# Patient Record
Sex: FEMALE | Race: WHITE | NOT HISPANIC OR LATINO | URBAN - METROPOLITAN AREA
[De-identification: names, ages, dates, MRNs, and addresses within clinical notes are randomized per-mention and may not be internally consistent; named-entity substitution may affect disease eponyms.]

---

## 2022-04-25 ENCOUNTER — CONSULTATION/EVALUATION (OUTPATIENT)
Dept: URBAN - METROPOLITAN AREA CLINIC 14 | Facility: CLINIC | Age: 32
End: 2022-04-25

## 2022-04-25 DIAGNOSIS — H52.13: ICD-10-CM

## 2022-04-25 DIAGNOSIS — H52.221: ICD-10-CM

## 2022-04-25 DIAGNOSIS — H52.7: ICD-10-CM

## 2022-04-25 PROCEDURE — 99499LK REFRACTIVE CONSULT/LASIK

## 2022-04-25 ASSESSMENT — KERATOMETRY
OD_K1POWER_DIOPTERS: 45.75
OD_AXISANGLE_DEGREES: 171
OS_K2POWER_DIOPTERS: 46
OS_AXISANGLE2_DEGREES: 109
OS_K1POWER_DIOPTERS: 45.75
OS_AXISANGLE_DEGREES: 19
OD_K2POWER_DIOPTERS: 46.50
OD_AXISANGLE2_DEGREES: 81

## 2022-04-25 ASSESSMENT — TONOMETRY
OS_IOP_MMHG: 11
OD_IOP_MMHG: 11

## 2022-04-25 ASSESSMENT — PACHYMETRY
OD_CT_UM: 510
OS_CT_UM: 515

## 2022-04-25 ASSESSMENT — VISUAL ACUITY
OS_SC: 20/25
OD_SC: 20/100

## 2022-05-05 ENCOUNTER — CLINIC PROCEDURE ONLY (OUTPATIENT)
Dept: URBAN - METROPOLITAN AREA CLINIC 14 | Facility: CLINIC | Age: 32
End: 2022-05-05

## 2022-05-05 DIAGNOSIS — H52.7: ICD-10-CM

## 2022-05-05 DIAGNOSIS — H52.13: ICD-10-CM

## 2022-05-05 DIAGNOSIS — H52.221: ICD-10-CM

## 2022-05-05 PROCEDURE — 66999L7KDS LASIK KDS PROMO 7

## 2022-05-05 PROCEDURE — 99199VFL VISION FOR LIFE

## 2022-05-05 ASSESSMENT — VISUAL ACUITY
OS_BCVA: 20/20
OD_BCVA: 20/20

## 2022-05-05 NOTE — PROCEDURE NOTE: CLINICAL
PROCEDURE NOTE: LASIK OU. Diagnosis: Refractive Error. Anesthesia: Oral Sedative. Prep: Antibiotic Drops. Betadine. After discussing risks, benefits and alternatives, patient has elected to proceed with the procedure and an informed consent was obtained. Prior to commencing surgery, patient identification, surgical procedure, site, and side with a time out were confirmed by the physician. Several drops of topical anesthetic and one drop of povidone iodine were instilled, and the eye was flushed with balance salt solution. A non-aspirating lid speculum was placed, and the patient was positioned under the femtosecond laser. A sterile patient interface was placed on the eye. After pressure was confirmed, and suction was achieved, the eye and flap procedure were centered and laser was applied, and LASIK flap created. The suction was discontinued, the patient interface was discarded, and the lid speculum removed. The patient was then positioned under the excimer laser. Prior to the treatment, patient identification, surgical procedure, site, and side with a time out were confirmed by the physician. A drop of topical anesthetic instilled. The patient was draped using a clean technique to isolate and cover the lashes and lid margins. An aspirating lid speculum was placed. A drop of balance salt solution was instilled. The edge of the LASIK flap was located and lifted with the Karey I IntraLase flap . The corneal bed dried with a Merocel eye sponge, the pupil was centered, and laser treatment was applied. An additional drop of balance salt solution applied to corneal bed; flap was repositioned/floated into position. Excess Balance Salt Solution was removed with a Stringer ring. The corneal flap was smoothed with wet and dry Merocel. A drop of topical antibiotic and steroid was instilled consecutively. The lid speculum removed. The patient was escorted to recovery area in stable condition and without complication. Post procedure instructions given. Jose Francisco Hewitt

## 2022-05-06 ENCOUNTER — POST-OP (OUTPATIENT)
Dept: URBAN - METROPOLITAN AREA CLINIC 14 | Facility: CLINIC | Age: 32
End: 2022-05-06

## 2022-05-06 DIAGNOSIS — Z98.890: ICD-10-CM

## 2022-05-06 PROCEDURE — 99024LK POST OP LASIK CARE ONLY

## 2022-05-06 ASSESSMENT — VISUAL ACUITY
OU_SC: 20/25-2
OS_SC: 20/30+1
OD_SC: 20/40

## 2022-05-12 ENCOUNTER — POST-OP (OUTPATIENT)
Dept: URBAN - METROPOLITAN AREA CLINIC 14 | Facility: CLINIC | Age: 32
End: 2022-05-12

## 2022-05-12 DIAGNOSIS — Z98.890: ICD-10-CM

## 2022-05-12 PROCEDURE — 99024LK POST OP LASIK CARE ONLY

## 2022-05-12 ASSESSMENT — VISUAL ACUITY
OS_SC: 20/20
OD_SC: 20/25

## 2022-06-01 ENCOUNTER — POST-OP (OUTPATIENT)
Dept: URBAN - METROPOLITAN AREA CLINIC 14 | Facility: CLINIC | Age: 32
End: 2022-06-01

## 2022-06-01 DIAGNOSIS — Z98.890: ICD-10-CM

## 2022-06-01 PROCEDURE — 99024LK POST OP LASIK CARE ONLY

## 2022-06-01 ASSESSMENT — VISUAL ACUITY
OD_SC: 20/30
OS_SC: 20/30
OU_SC: 20/25

## 2022-08-16 ENCOUNTER — ESTABLISHED PATIENT (OUTPATIENT)
Dept: URBAN - METROPOLITAN AREA CLINIC 14 | Facility: CLINIC | Age: 32
End: 2022-08-16

## 2022-08-16 DIAGNOSIS — Z98.890: ICD-10-CM

## 2022-08-16 PROCEDURE — 99024LK POST OP LASIK CARE ONLY

## 2022-08-16 ASSESSMENT — KERATOMETRY
OD_AXISANGLE_DEGREES: 1
OD_K1POWER_DIOPTERS: 44
OD_K2POWER_DIOPTERS: 44.50
OD_AXISANGLE2_DEGREES: 91
OS_K1POWER_DIOPTERS: 45.75
OS_AXISANGLE_DEGREES: 0
OS_K2POWER_DIOPTERS: 45.75
OS_AXISANGLE2_DEGREES: 90

## 2022-08-16 ASSESSMENT — VISUAL ACUITY
OD_SC: 20/40
OS_SC: 20/25